# Patient Record
Sex: FEMALE | Race: WHITE
[De-identification: names, ages, dates, MRNs, and addresses within clinical notes are randomized per-mention and may not be internally consistent; named-entity substitution may affect disease eponyms.]

---

## 2018-01-27 ENCOUNTER — HOSPITAL ENCOUNTER (INPATIENT)
Dept: HOSPITAL 80 - SANE | Age: 53
LOS: 4 days | Discharge: NURSING, REHAB OR LONG-TERM CARE | DRG: 923 | End: 2018-01-31
Attending: INTERNAL MEDICINE | Admitting: INTERNAL MEDICINE
Payer: COMMERCIAL
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
## 2018-01-27 DIAGNOSIS — G89.29: ICD-10-CM

## 2018-01-27 DIAGNOSIS — N31.9: ICD-10-CM

## 2018-01-27 DIAGNOSIS — G35: ICD-10-CM

## 2018-01-27 DIAGNOSIS — L98.491: ICD-10-CM
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
## 2018-01-27 DIAGNOSIS — Z87.2: ICD-10-CM

## 2018-01-27 DIAGNOSIS — T76.21XA: Primary | ICD-10-CM

## 2018-01-27 DIAGNOSIS — L98.411: ICD-10-CM

## 2018-01-27 DIAGNOSIS — F17.210: ICD-10-CM

## 2018-01-27 DIAGNOSIS — F12.90: ICD-10-CM

## 2018-01-27 DIAGNOSIS — T83.030A: ICD-10-CM

## 2018-01-27 LAB — PLATELET # BLD: 393 10^3/UL (ref 150–400)

## 2018-01-27 RX ADMIN — ACETAMINOPHEN PRN MG: 325 TABLET ORAL at 21:52

## 2018-01-27 RX ADMIN — NICOTINE SCH MG: 21 PATCH, EXTENDED RELEASE TOPICAL at 19:40

## 2018-01-27 RX ADMIN — ZOLPIDEM TARTRATE PRN MG: 5 TABLET ORAL at 21:52
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
## 2018-01-28 PROCEDURE — 0T2BX0Z CHANGE DRAINAGE DEVICE IN BLADDER, EXTERNAL APPROACH: ICD-10-PCS | Performed by: UROLOGY

## 2018-01-28 RX ADMIN — METHENAMINE HIPPURATE SCH GM: 1 TABLET ORAL at 10:13

## 2018-01-28 RX ADMIN — OXYCODONE HYDROCHLORIDE AND ACETAMINOPHEN PRN TAB: 5; 325 TABLET ORAL at 20:24

## 2018-01-28 RX ADMIN — Medication SCH ML: at 18:07

## 2018-01-28 RX ADMIN — OXYCODONE HYDROCHLORIDE AND ACETAMINOPHEN PRN TAB: 5; 325 TABLET ORAL at 18:06

## 2018-01-28 RX ADMIN — GABAPENTIN SCH MG: 300 CAPSULE ORAL at 06:04

## 2018-01-28 RX ADMIN — GUAIFENESIN SCH MG: 600 TABLET, EXTENDED RELEASE ORAL at 10:14

## 2018-01-28 RX ADMIN — GABAPENTIN SCH MG: 300 CAPSULE ORAL at 14:36

## 2018-01-28 RX ADMIN — Medication SCH ML: at 15:08

## 2018-01-28 RX ADMIN — BACLOFEN SCH MG: 20 TABLET ORAL at 14:36

## 2018-01-28 RX ADMIN — GUAIFENESIN SCH MG: 600 TABLET, EXTENDED RELEASE ORAL at 20:25

## 2018-01-28 RX ADMIN — DOCUSATE SODIUM AND SENNOSIDES SCH: 50; 8.6 TABLET ORAL at 15:10

## 2018-01-28 RX ADMIN — ACETAMINOPHEN PRN MG: 325 TABLET ORAL at 04:23

## 2018-01-28 RX ADMIN — ACETAMINOPHEN PRN MG: 325 TABLET ORAL at 10:14

## 2018-01-28 RX ADMIN — METHENAMINE HIPPURATE SCH GM: 1 TABLET ORAL at 20:27

## 2018-01-28 RX ADMIN — GABAPENTIN SCH MG: 300 CAPSULE ORAL at 18:07

## 2018-01-28 RX ADMIN — BACLOFEN SCH MG: 20 TABLET ORAL at 20:27

## 2018-01-28 RX ADMIN — ENOXAPARIN SODIUM SCH MG: 100 INJECTION SUBCUTANEOUS at 10:15

## 2018-01-28 RX ADMIN — BACLOFEN SCH MG: 20 TABLET ORAL at 18:07

## 2018-01-28 RX ADMIN — DOCUSATE SODIUM AND SENNOSIDES SCH: 50; 8.6 TABLET ORAL at 20:26

## 2018-01-28 RX ADMIN — BACLOFEN SCH MG: 20 TABLET ORAL at 06:04

## 2018-01-28 RX ADMIN — GABAPENTIN SCH MG: 300 CAPSULE ORAL at 20:26

## 2018-01-28 RX ADMIN — POTASSIUM CHLORIDE SCH MEQ: 1500 TABLET, EXTENDED RELEASE ORAL at 10:13

## 2018-01-28 RX ADMIN — Medication SCH ML: at 11:57

## 2018-01-28 RX ADMIN — NICOTINE SCH MG: 21 PATCH, EXTENDED RELEASE TOPICAL at 10:15

## 2018-01-28 NOTE — WOCRNPDOC
WOCRN Advanced Assessment Note





- Skin Integrity Problem, Advanced Assess


  ** Left Lateral Distal Foot


Dressing Type: Open to Air


Roseann Wound Tissue: Blanching, Intact


Wound Bed Color: Pink


Wound Bed Constitution: Healed (dry)


Site Measurement - Head-to-Toe Length X Width X Depth (cm): 2.0e1nbzlprh


Skin Integrity Problem Comment: Patient with healed full thickness wound to 

this area.  states that it has "been a long time", but that the patient 

was followed closely by wound care at [facility]. Skin is healed but very dry 

at this time. Patient in protective heel boots. I support this plan. Wound care 

does not need to follow this wound at this time. Please reconsult PRN if opens.





  ** Left Medial Distal Foot


Dressing Type: Open to Air


Wound Bed Color: Pink


Wound Bed Constitution: Healed


Site Measurement - Head-to-Toe Length X Width X Depth (cm): 0.7x0.5xintact


Skin Integrity Problem Comment: Patient with previous wound to this area, now 

healed. Skin dry but intact. Patient in protective heel boots. Wound care will 

not continue to follow. Please reconsult if area opens.
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
## 2018-01-29 RX ADMIN — BACLOFEN SCH MG: 20 TABLET ORAL at 20:07

## 2018-01-29 RX ADMIN — BACLOFEN SCH MG: 20 TABLET ORAL at 15:29

## 2018-01-29 RX ADMIN — GUAIFENESIN SCH MG: 600 TABLET, EXTENDED RELEASE ORAL at 20:07

## 2018-01-29 RX ADMIN — BACLOFEN SCH MG: 20 TABLET ORAL at 06:41

## 2018-01-29 RX ADMIN — GABAPENTIN SCH MG: 300 CAPSULE ORAL at 11:27

## 2018-01-29 RX ADMIN — METHENAMINE HIPPURATE SCH GM: 1 TABLET ORAL at 20:07

## 2018-01-29 RX ADMIN — DOCUSATE SODIUM AND SENNOSIDES SCH TAB: 50; 8.6 TABLET ORAL at 20:07

## 2018-01-29 RX ADMIN — DOCUSATE SODIUM AND SENNOSIDES SCH TAB: 50; 8.6 TABLET ORAL at 08:52

## 2018-01-29 RX ADMIN — POTASSIUM CHLORIDE SCH MEQ: 1500 TABLET, EXTENDED RELEASE ORAL at 08:52

## 2018-01-29 RX ADMIN — GABAPENTIN SCH MG: 300 CAPSULE ORAL at 06:41

## 2018-01-29 RX ADMIN — METHENAMINE HIPPURATE SCH GM: 1 TABLET ORAL at 08:51

## 2018-01-29 RX ADMIN — GABAPENTIN SCH MG: 300 CAPSULE ORAL at 20:07

## 2018-01-29 RX ADMIN — GUAIFENESIN SCH MG: 600 TABLET, EXTENDED RELEASE ORAL at 08:51

## 2018-01-29 RX ADMIN — GABAPENTIN SCH MG: 300 CAPSULE ORAL at 15:19

## 2018-01-29 RX ADMIN — ZOLPIDEM TARTRATE PRN MG: 5 TABLET ORAL at 23:28

## 2018-01-29 RX ADMIN — OXYCODONE HYDROCHLORIDE AND ACETAMINOPHEN PRN TAB: 5; 325 TABLET ORAL at 06:41

## 2018-01-29 RX ADMIN — Medication SCH ML: at 15:29

## 2018-01-29 RX ADMIN — ENOXAPARIN SODIUM SCH MG: 100 INJECTION SUBCUTANEOUS at 08:52

## 2018-01-29 RX ADMIN — NICOTINE SCH MG: 21 PATCH, EXTENDED RELEASE TOPICAL at 08:51

## 2018-01-29 RX ADMIN — Medication SCH ML: at 11:30

## 2018-01-29 RX ADMIN — BACLOFEN SCH MG: 20 TABLET ORAL at 11:27

## 2018-01-29 RX ADMIN — OXYCODONE HYDROCHLORIDE AND ACETAMINOPHEN PRN TAB: 5; 325 TABLET ORAL at 20:07

## 2018-01-29 RX ADMIN — Medication SCH ML: at 08:55
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
## 2018-01-30 RX ADMIN — Medication SCH ML: at 16:47

## 2018-01-30 RX ADMIN — DOCUSATE SODIUM AND SENNOSIDES SCH TAB: 50; 8.6 TABLET ORAL at 08:55

## 2018-01-30 RX ADMIN — GABAPENTIN SCH MG: 300 CAPSULE ORAL at 11:26

## 2018-01-30 RX ADMIN — GUAIFENESIN SCH MG: 600 TABLET, EXTENDED RELEASE ORAL at 08:55

## 2018-01-30 RX ADMIN — GABAPENTIN SCH MG: 300 CAPSULE ORAL at 16:46

## 2018-01-30 RX ADMIN — POTASSIUM CHLORIDE SCH MEQ: 1500 TABLET, EXTENDED RELEASE ORAL at 08:53

## 2018-01-30 RX ADMIN — BACLOFEN SCH MG: 20 TABLET ORAL at 21:03

## 2018-01-30 RX ADMIN — OXYCODONE HYDROCHLORIDE AND ACETAMINOPHEN PRN TAB: 5; 325 TABLET ORAL at 21:03

## 2018-01-30 RX ADMIN — Medication SCH ML: at 11:34

## 2018-01-30 RX ADMIN — OXYCODONE HYDROCHLORIDE AND ACETAMINOPHEN PRN TAB: 5; 325 TABLET ORAL at 16:46

## 2018-01-30 RX ADMIN — GUAIFENESIN SCH MG: 600 TABLET, EXTENDED RELEASE ORAL at 21:03

## 2018-01-30 RX ADMIN — BACLOFEN SCH MG: 20 TABLET ORAL at 11:26

## 2018-01-30 RX ADMIN — NICOTINE SCH MG: 21 PATCH, EXTENDED RELEASE TOPICAL at 08:49

## 2018-01-30 RX ADMIN — ENOXAPARIN SODIUM SCH MG: 100 INJECTION SUBCUTANEOUS at 08:56

## 2018-01-30 RX ADMIN — Medication SCH ML: at 08:49

## 2018-01-30 RX ADMIN — METHENAMINE HIPPURATE SCH GM: 1 TABLET ORAL at 08:54

## 2018-01-30 RX ADMIN — GABAPENTIN SCH MG: 300 CAPSULE ORAL at 21:02

## 2018-01-30 RX ADMIN — ZOLPIDEM TARTRATE PRN MG: 5 TABLET ORAL at 21:03

## 2018-01-30 RX ADMIN — GABAPENTIN SCH MG: 300 CAPSULE ORAL at 05:29

## 2018-01-30 RX ADMIN — DOCUSATE SODIUM AND SENNOSIDES SCH TAB: 50; 8.6 TABLET ORAL at 21:03

## 2018-01-30 RX ADMIN — OXYCODONE HYDROCHLORIDE AND ACETAMINOPHEN PRN TAB: 5; 325 TABLET ORAL at 11:26

## 2018-01-30 RX ADMIN — BACLOFEN SCH MG: 20 TABLET ORAL at 16:46

## 2018-01-30 RX ADMIN — METHENAMINE HIPPURATE SCH GM: 1 TABLET ORAL at 21:03

## 2018-01-30 RX ADMIN — BACLOFEN SCH MG: 20 TABLET ORAL at 05:29
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
## 2018-01-31 VITALS
RESPIRATION RATE: 12 BRPM | DIASTOLIC BLOOD PRESSURE: 69 MMHG | TEMPERATURE: 97.4 F | SYSTOLIC BLOOD PRESSURE: 106 MMHG | HEART RATE: 76 BPM

## 2018-01-31 VITALS — OXYGEN SATURATION: 95 %

## 2018-01-31 RX ADMIN — NICOTINE SCH MG: 21 PATCH, EXTENDED RELEASE TOPICAL at 08:03

## 2018-01-31 RX ADMIN — GUAIFENESIN SCH MG: 600 TABLET, EXTENDED RELEASE ORAL at 08:02

## 2018-01-31 RX ADMIN — POTASSIUM CHLORIDE SCH MEQ: 1500 TABLET, EXTENDED RELEASE ORAL at 08:02

## 2018-01-31 RX ADMIN — OXYCODONE HYDROCHLORIDE AND ACETAMINOPHEN PRN TAB: 5; 325 TABLET ORAL at 15:08

## 2018-01-31 RX ADMIN — GABAPENTIN SCH MG: 300 CAPSULE ORAL at 13:14

## 2018-01-31 RX ADMIN — BACLOFEN SCH MG: 20 TABLET ORAL at 05:03

## 2018-01-31 RX ADMIN — BACLOFEN SCH MG: 20 TABLET ORAL at 13:15

## 2018-01-31 RX ADMIN — DOCUSATE SODIUM AND SENNOSIDES SCH TAB: 50; 8.6 TABLET ORAL at 08:02

## 2018-01-31 RX ADMIN — GABAPENTIN SCH MG: 300 CAPSULE ORAL at 05:03

## 2018-01-31 RX ADMIN — Medication SCH ML: at 08:00

## 2018-01-31 RX ADMIN — METHENAMINE HIPPURATE SCH GM: 1 TABLET ORAL at 08:02

## 2018-01-31 RX ADMIN — ENOXAPARIN SODIUM SCH MG: 100 INJECTION SUBCUTANEOUS at 08:01

## 2018-01-31 RX ADMIN — Medication SCH ML: at 13:15

## 2018-01-31 NOTE — PDIAF
- Diagnosis


Diagnosis: MS


Code Status: Do Not Resuscitate





- Medication Management


Discharge Medications: 


 Medications to Continue on Transfer





Baclofen [Baclofen 20 mg (*)] 20 mg PO QID 03/16/17 [Last Taken 01/27/18 11:00]


Ergocalciferol (Vitamin D2) [Vitamin D2] 2,000 unit PO DAILY 03/16/17 [Last 

Taken 01/27/18]


Fingolimod HCl [Gilenya] 0.5 mg PO DAILY 03/16/17 [Last Taken 01/27/18]


Gabapentin [Neurontin 300 MG (*)] 300 mg PO QID 03/16/17 [Last Taken 01/27/18 11

:00]


Herbals/Supplements -Info Only 1 ea PO DAILY 03/16/17 [Last Taken Unknown]


Methenamine Aaron [Hiprex 1 gm (*)] 1 gm PO BID 03/16/17 [Last Taken 01/27/18 08:

00]


buPROPion SR [Wellbutrin 150mg SR (*)] 150 mg PO BID 03/16/17 [Last Taken 01/27/ 18 08:00]


Acetaminophen [Tylenol 325mg (*)] 650 mg PO BID 01/27/18 [Last Taken 01/27/18 08

:00]


Gluc Oxid/l-Peroxid/Muramidase [Biotene Mouthwash (*)] 15 ml MM TID@08,11,16 01/ 27/18 [Last Taken 01/27/18 11:00]


Ipratropium/Albuterol [Duoneb (*)] 3 ml IH Q4H PRN 01/27/18 [Last Taken Unknown]


Potassium Cl [Klor-Con 20 meq (*)] 20 meq PO DAILY 01/27/18 [Last Taken 01/27/18

]


Sennosides/Docusate Sodium [Senna-Docusate Sodium Tablet] 1 each PO BID 01/27/ 18 [Last Taken 01/27/18 08:00]


guaiFENesin [Guaifenesin ER] 600 mg PO BID 01/27/18 [Last Taken 01/27/18 08:00]


Acetaminophen [Tylenol 325mg (*)] 650 mg PO Q4HRS PRN  tab 01/31/18 [Last Taken 

Unknown]








Discharge Medications: Refer to the Discharge Home Medication list for PRN 

reason.





- Orders


Services needed: Registered Nurse, Certified Nursing Aide, Master 

, Physical Therapy, Occupational Therapy


Diet Recommendation: no restrictions on diet


Diet Texture: Dysphagia 3 - Advanced - Moist, Bite-Size, Thin Liquids, Meds 

Whole in Puree





- Follow Up Care


Current Providers and Referrals: 


NONE *PRIMARY CARE P,. [Primary Care Provider] - follow up in 2 weeks


()
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
## 2018-02-17 ENCOUNTER — HOSPITAL ENCOUNTER (EMERGENCY)
Dept: HOSPITAL 80 - FED | Age: 53
Discharge: HOME | End: 2018-02-17
Payer: COMMERCIAL

## 2018-02-17 VITALS — DIASTOLIC BLOOD PRESSURE: 78 MMHG | SYSTOLIC BLOOD PRESSURE: 130 MMHG | HEART RATE: 70 BPM | OXYGEN SATURATION: 95 %

## 2018-02-17 VITALS — TEMPERATURE: 97.9 F | RESPIRATION RATE: 20 BRPM

## 2018-02-17 DIAGNOSIS — I50.9: ICD-10-CM

## 2018-02-17 DIAGNOSIS — T83.090A: Primary | ICD-10-CM
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
## 2018-02-17 NOTE — EDPHY
H & P


HPI/ROS: 





Chief Complaint:  Super pubic catheter blocked





HPI:  52-year-old resident of [facility] coming in with complaints 

that her suprapubic catheter is blocked.  Per EMS report staff attempted to 

flush the catheter met resistance.  They were unable to remove the catheter 

secondary to the patient complaining of pain.  She denies any fevers or chills.

  No nausea or vomiting.  No abdominal pain.





ROS:  10 point Review of Systems is negative except as noted in the HPI.





Family History: non-contributory





Physical Exam:


Gen: Awake, Alert, No Distress


HEENT:  


     Nose: no rhinorrhea


     Eyes: PERRLA, EOMI


     Mouth: Moist mucosa 


Neck: Supple, no JVD


Chest: nontender, lungs clear to auscultation


Heart: S1, S2 normal, no murmur


Abd: Soft, suprapubic catheter in place.  No discharge or bleeding.  Abdomen is 

nontender, no guarding


Back: no CVA tenderness, no midline tenderness 


Ext: no edema, non-tender


Skin: no rash


Neuro: CN II-XII intact, Sensation grossly intact, Strength 5/5 in bilateral 

upper and lower extremities








- Medical/Surgical History


Hx Asthma: No


Hx Chronic Respiratory Disease: Yes


Hx Diabetes: No


Hx Cardiac Disease: Yes


Hx Renal Disease: No


Hx Cirrhosis: No


Hx Alcoholism: No


Hx HIV/AIDS: No


Hx Splenectomy or Spleen Trauma: No


Other PMH: CHF, MS, dysphagia, SZ, PEARL, neurogenia bladder-chronic foly, 

depression, cognitive communication defict





- Social History


Smoking Status: Unknown if ever smoked


Allergies/Adverse Reactions: 


 





hydrocodone Allergy (Verified 02/17/18 22:13)


 Itching








Home Medications: 














 Medication  Instructions  Recorded


 


Baclofen [Baclofen 20 mg (*)] 20 mg PO QID 03/16/17


 


Ergocalciferol (Vitamin D2) 2,000 unit PO DAILY 03/16/17





[Vitamin D2]  


 


Fingolimod HCl [Gilenya] 0.5 mg PO DAILY 03/16/17


 


Gabapentin [Neurontin 300 MG (*)] 300 mg PO QID 03/16/17


 


Herbals/Supplements -Info Only 1 ea PO DAILY 03/16/17


 


Methenamine Aaron [Hiprex 1 gm (*)] 1 gm PO BID 03/16/17


 


buPROPion SR [Wellbutrin 150mg  mg PO BID 03/16/17





(*)]  


 


Acetaminophen [Tylenol 325mg (*)] 650 mg PO BID 01/27/18


 


Gluc Oxid/l-Peroxid/Muramidase 15 ml MM TID@08,11,16 01/27/18





[Biotene Mouthwash (*)]  


 


Ipratropium/Albuterol [Duoneb (*)] 3 ml IH Q4H PRN 01/27/18


 


Potassium Cl [Klor-Con 20 meq (*)] 20 meq PO DAILY 01/27/18


 


Sennosides/Docusate Sodium 1 each PO BID 01/27/18





[Senna-Docusate Sodium Tablet]  


 


guaiFENesin [Guaifenesin ER] 600 mg PO BID 01/27/18


 


Acetaminophen [Tylenol 325mg (*)] 650 mg PO Q4HRS PRN  tab 01/31/18














Departure





- Departure


Disposition: Home, Routine, Self-Care


Clinical Impression: 


 Suprapubic catheter dysfunction





Condition: Good


Instructions:  How to Care for Your Suprapubic Catheter (ED)


Additional Instructions: 


Follow up with your primary care physician in 3-4 days for any concerns.





Referrals: 


Patient,NotPresent [Primary Care Provider] - As per Instructions

## 2018-02-24 ENCOUNTER — HOSPITAL ENCOUNTER (OUTPATIENT)
Dept: HOSPITAL 80 - FIMAGING | Age: 53
End: 2018-02-24
Attending: PHYSICIAN ASSISTANT
Payer: COMMERCIAL

## 2018-02-24 DIAGNOSIS — N13.39: ICD-10-CM

## 2018-02-24 DIAGNOSIS — Z96.0: ICD-10-CM

## 2018-02-24 DIAGNOSIS — N20.0: Primary | ICD-10-CM

## 2018-02-24 PROCEDURE — 74178 CT ABD&PLV WO CNTR FLWD CNTR: CPT

## 2018-05-11 ENCOUNTER — HOSPITAL ENCOUNTER (EMERGENCY)
Dept: HOSPITAL 80 - FED | Age: 53
Discharge: HOME | End: 2018-05-11
Payer: COMMERCIAL

## 2018-05-11 VITALS — SYSTOLIC BLOOD PRESSURE: 106 MMHG | DIASTOLIC BLOOD PRESSURE: 71 MMHG

## 2018-05-11 DIAGNOSIS — R05: Primary | ICD-10-CM

## 2018-05-11 DIAGNOSIS — F17.200: ICD-10-CM

## 2018-05-11 LAB — PLATELET # BLD: 306 10^3/UL (ref 150–400)
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
## 2018-09-26 ENCOUNTER — HOSPITAL ENCOUNTER (EMERGENCY)
Dept: HOSPITAL 80 - FED | Age: 53
Discharge: HOME | End: 2018-09-26
Payer: COMMERCIAL

## 2018-09-26 VITALS — DIASTOLIC BLOOD PRESSURE: 75 MMHG | SYSTOLIC BLOOD PRESSURE: 97 MMHG

## 2018-09-26 DIAGNOSIS — G35: ICD-10-CM
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
## 2018-09-26 DIAGNOSIS — S16.1XXA: Primary | ICD-10-CM

## 2018-09-26 DIAGNOSIS — S00.03XA: ICD-10-CM

## 2018-09-26 DIAGNOSIS — Z99.3: ICD-10-CM
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
